# Patient Record
Sex: FEMALE | Race: BLACK OR AFRICAN AMERICAN | NOT HISPANIC OR LATINO | Employment: FULL TIME | URBAN - NONMETROPOLITAN AREA
[De-identification: names, ages, dates, MRNs, and addresses within clinical notes are randomized per-mention and may not be internally consistent; named-entity substitution may affect disease eponyms.]

---

## 2023-09-29 ENCOUNTER — APPOINTMENT (OUTPATIENT)
Dept: GENERAL RADIOLOGY | Facility: HOSPITAL | Age: 23
End: 2023-09-29

## 2023-09-29 ENCOUNTER — HOSPITAL ENCOUNTER (EMERGENCY)
Facility: HOSPITAL | Age: 23
Discharge: HOME | End: 2023-09-29

## 2023-09-29 VITALS
HEART RATE: 88 BPM | BODY MASS INDEX: 27.99 KG/M2 | SYSTOLIC BLOOD PRESSURE: 130 MMHG | TEMPERATURE: 98 F | DIASTOLIC BLOOD PRESSURE: 81 MMHG | HEIGHT: 65 IN | RESPIRATION RATE: 14 BRPM | OXYGEN SATURATION: 99 % | WEIGHT: 168 LBS

## 2023-09-29 DIAGNOSIS — M53.3 COCCYX PAIN: Primary | ICD-10-CM

## 2023-09-29 PROCEDURE — 72110 X-RAY EXAM L-2 SPINE 4/>VWS: CPT

## 2023-09-29 PROCEDURE — 25010000002 KETOROLAC TROMETHAMINE PER 15 MG

## 2023-09-29 PROCEDURE — 96372 THER/PROPH/DIAG INJ SC/IM: CPT

## 2023-09-29 PROCEDURE — 99282 EMERGENCY DEPT VISIT SF MDM: CPT

## 2023-09-29 PROCEDURE — 72220 X-RAY EXAM SACRUM TAILBONE: CPT

## 2023-09-29 RX ORDER — KETOROLAC TROMETHAMINE 15 MG/ML
15 INJECTION, SOLUTION INTRAMUSCULAR; INTRAVENOUS ONCE
Status: COMPLETED | OUTPATIENT
Start: 2023-09-29 | End: 2023-09-29

## 2023-09-29 RX ADMIN — KETOROLAC TROMETHAMINE 15 MG: 15 INJECTION, SOLUTION INTRAMUSCULAR; INTRAVENOUS at 13:48

## 2023-09-29 NOTE — ED PROVIDER NOTES
Subjective   History of Present Illness  Patient is a 23-year-old female who presents emergency department with complaints of tailbone pain.  States that she noticed the pain 1 week ago.  She states that the pain is worse when she is sitting.  States that the pain goes away completely when she is sitting forward or standing up.  She states that sometimes she gets a sharp pain down her right leg, but it goes away fast.  She denies any injury or trauma.  Denies any issues using the bathroom.  States that she does a lot of lifting at work and is unsure if she could have injured it this way.  She is able to walk with a normal gait.  No loss of sensation.      Review of Systems   Musculoskeletal:  Positive for back pain.   All other systems reviewed and are negative.    No past medical history on file.    Allergies   Allergen Reactions    Nuts Shortness Of Breath       No past surgical history on file.    No family history on file.    Social History     Socioeconomic History    Marital status: Single   Tobacco Use    Smoking status: Never    Smokeless tobacco: Never   Vaping Use    Vaping Use: Never used   Substance and Sexual Activity    Alcohol use: Not Currently    Drug use: Never    Sexual activity: Not Currently           Objective   Physical Exam  Vitals and nursing note reviewed.   Constitutional:       General: She is not in acute distress.     Appearance: Normal appearance. She is normal weight. She is not ill-appearing or toxic-appearing.   HENT:      Head: Normocephalic.   Cardiovascular:      Rate and Rhythm: Normal rate.   Pulmonary:      Effort: Pulmonary effort is normal.   Musculoskeletal:         General: No tenderness or deformity. Normal range of motion.      Cervical back: Normal range of motion and neck supple.      Comments: No tenderness on lumbar region.   Skin:     General: Skin is warm and dry.   Neurological:      General: No focal deficit present.      Mental Status: She is alert and oriented  to person, place, and time. Mental status is at baseline.      Sensory: No sensory deficit.   Psychiatric:         Mood and Affect: Mood normal.         Behavior: Behavior normal.         Thought Content: Thought content normal.         Judgment: Judgment normal.       Procedures           ED Course                                           Medical Decision Making  Patient is a 23-year-old female who presents emergency department with complaints of tailbone pain.  States that she noticed the pain 1 week ago.  She states that the pain is worse when she is sitting.  States that the pain goes away completely when she is sitting forward or standing up.  She states that sometimes she gets a sharp pain down her right leg, but it goes away fast.  She denies any injury or trauma.  Denies any issues using the bathroom.  States that she does a lot of lifting at work and is unsure if she could have injured it this way.  She is able to walk with a normal gait.  No loss of sensation.    Patient was non-toxic and not-ill appearing on arrival. Vital signs stable.     Patient's presentation raises suspicion for differentials including, but not limited to, fracture, dislocation, muscle strain, sciatica.     External (non-ED) record review: None    Given this, imaging studies were ordered including x-ray lumbar spine and x-ray sacrum and coccyx.    Cori was given IM Toradol for symptomatic relief.    Imaging was reviewed by radiologist.  X-rays revealed no acute findings.    On re-evaluation, patient remained hemodynamically stable and appeared to be comfortable and in no acute distress.    I discussed the x-ray results with the patient during this visit in the emergency department. I answered all the questions regarding the emergency department evaluation, diagnosis, and treatment plan.  Advised patient to follow-up with primary care provider soon as possible to reassess symptoms.  Advised patient to return the emergency  department for any new or worsening symptoms. The patient verbalized understanding of the discharge instructions and agreed with them. Cori was discharged in stable condition.    Signed by:   NAVIN Huang 9/29/2023 14:43 CDT     Dragon disclaimer:  Part of this note may be an electronic transcription/translation of spoken language to printed text using the Dragon Dictation System.    Problems Addressed:  Coccyx pain: acute illness or injury    Amount and/or Complexity of Data Reviewed  Radiology: ordered.    Risk  Prescription drug management.        Final diagnoses:   Coccyx pain       ED Disposition  ED Disposition       ED Disposition   Discharge    Condition   Stable    Comment   --               Provider, No Known  [facility] [address]  [phone]    Schedule an appointment as soon as possible for a visit in 1 day      [facility] EMERGENCY DEPARTMENT  [address] [place] [id number] [phone]  Go to   If symptoms worsen         Medication List      No changes were made to your prescriptions during this visit.            Cindy Issa APRN  09/29/23 1446

## 2023-09-29 NOTE — DISCHARGE INSTRUCTIONS
Today you are seen in the emergency department for symptoms.  Your x-rays were negative.  Please follow with primary care provider soon as possible to reassess symptoms.  Please return emergency department for any new or worsening symptoms.